# Patient Record
Sex: MALE | Race: WHITE | NOT HISPANIC OR LATINO | RURAL
[De-identification: names, ages, dates, MRNs, and addresses within clinical notes are randomized per-mention and may not be internally consistent; named-entity substitution may affect disease eponyms.]

---

## 2023-04-05 ENCOUNTER — TELEPHONE (OUTPATIENT)
Dept: ORTHOPEDICS | Facility: CLINIC | Age: 68
End: 2023-04-05
Payer: MEDICARE

## 2023-04-06 DIAGNOSIS — M25.562 LEFT KNEE PAIN, UNSPECIFIED CHRONICITY: Primary | ICD-10-CM

## 2023-04-10 ENCOUNTER — OFFICE VISIT (OUTPATIENT)
Dept: ORTHOPEDICS | Facility: CLINIC | Age: 68
End: 2023-04-10
Payer: MEDICARE

## 2023-04-10 ENCOUNTER — HOSPITAL ENCOUNTER (OUTPATIENT)
Dept: RADIOLOGY | Facility: HOSPITAL | Age: 68
Discharge: HOME | End: 2023-04-10
Attending: ORTHOPAEDIC SURGERY
Payer: MEDICARE

## 2023-04-10 VITALS — WEIGHT: 185 LBS | BODY MASS INDEX: 25.06 KG/M2 | HEIGHT: 72 IN

## 2023-04-10 DIAGNOSIS — M17.12 PRIMARY OSTEOARTHRITIS OF LEFT KNEE: Primary | ICD-10-CM

## 2023-04-10 DIAGNOSIS — M25.562 LEFT KNEE PAIN, UNSPECIFIED CHRONICITY: ICD-10-CM

## 2023-04-10 PROCEDURE — 73562 X-RAY EXAM OF KNEE 3: CPT | Mod: 26,LT,, | Performed by: ORTHOPAEDIC SURGERY

## 2023-04-10 PROCEDURE — 99213 OFFICE O/P EST LOW 20 MIN: CPT | Mod: PBBFAC | Performed by: ORTHOPAEDIC SURGERY

## 2023-04-10 PROCEDURE — 73562 XR KNEE AP LAT WITH SUNRISE LEFT: ICD-10-PCS | Mod: 26,LT,, | Performed by: ORTHOPAEDIC SURGERY

## 2023-04-10 PROCEDURE — 99204 PR OFFICE/OUTPT VISIT, NEW, LEVL IV, 45-59 MIN: ICD-10-PCS | Mod: S$PBB,,, | Performed by: ORTHOPAEDIC SURGERY

## 2023-04-10 PROCEDURE — 99204 OFFICE O/P NEW MOD 45 MIN: CPT | Mod: S$PBB,,, | Performed by: ORTHOPAEDIC SURGERY

## 2023-04-10 PROCEDURE — 73562 X-RAY EXAM OF KNEE 3: CPT | Mod: TC,LT

## 2023-04-10 RX ORDER — GABAPENTIN 300 MG/1
1 CAPSULE ORAL NIGHTLY
COMMUNITY
Start: 2023-04-04

## 2023-04-10 RX ORDER — MELOXICAM 15 MG/1
1 TABLET ORAL DAILY
COMMUNITY
Start: 2023-04-04

## 2023-04-10 RX ORDER — LISINOPRIL 20 MG/1
20 TABLET ORAL DAILY
COMMUNITY
Start: 2023-04-04

## 2023-04-10 NOTE — PROGRESS NOTES
CLINIC NOTE       Chief Complaint   Patient presents with    Left Knee - Pain        Elvis Freeman is a 68 y.o. male seen today for evaluation left knee pain.  He is a retired physical therapist.  He is undergone right TKR performed by Dr. Fernie martino in the past.  Additionally he had a left knee ACL reconstruction performed by Dr. Martino in the past as well.  He is had progressive left knee pain and genu varum.  X-rays left knee today 04/10/2023 shows bones well mineralized.  There is severe tricompartmental DJD with complete obliteration of medial joint space, genu varum and translation of the femur on the tibia.  Marginal osteophytes present and tricompartmental distribution.    History reviewed. No pertinent past medical history.  History reviewed. No pertinent family history.  Current Outpatient Medications on File Prior to Visit   Medication Sig Dispense Refill    gabapentin (NEURONTIN) 300 MG capsule Take 1 capsule by mouth nightly.      lisinopriL (PRINIVIL,ZESTRIL) 20 MG tablet Take 20 mg by mouth once daily. For 30 days      meloxicam (MOBIC) 15 MG tablet Take 1 tablet by mouth once daily.       No current facility-administered medications on file prior to visit.       ROS     There were no vitals filed for this visit.    Past Surgical History:   Procedure Laterality Date    ANTERIOR CRUCIATE LIGAMENT REPAIR Left     APPENDECTOMY N/A     HERNIA REPAIR N/A     KNEE SURGERY Right     knee replacement    LASIK          Review of patient's allergies indicates:   Allergen Reactions    Benadryl allergy decongestant     Opioids - morphine analogues Hives        Ortho Exam :  Well-developed well-nourished  male no acute distress.  Alert oriented cooperative.  Neck is supple without JVD.  Breathing is regular nonlabored.  Skin is warm dry no lesions seen.  Exam left knee shows severe genu varum.  Well-healed incision over the medial parapatellar region  Knee motion is 0-125 degrees of flexion.  Knee  ligaments stable clinically.  Medial and subpatellar crepitation is appreciated with knee motion.  Knee ligaments stable clinically.      Assessment and Plan  There are no problems to display for this patient.   Impression:  Severe end-stage DJD-left knee  Plan:  Patient offered left total knee replacement arthroplasty.  Procedure was discussed in detail using models and actual components.  The potential benefits and risks surgery outlined to include but not limited to bleeding infection, damage to blood vessels and nerves, need for further surgery, other risks and complications including even death.  Patient indicates he lives alone has a sister in [place] who has undergone TKR.  Discussed the option for swing bed placement.  He has 2 dogs at home that concern regarding their care while he is recovering.  He will call for scheduling left TKR if he wished to proceed.      Edgar Florez M.D.

## 2023-04-10 NOTE — PROGRESS NOTES
Radiology Interpretation        Patient Name: Elvis Freeman  Date: 4/10/2023  YOB: 1955  MRN# [id number]        ORDERING DIAGNOSIS:  No diagnosis found.          X-rays left knee today 04/10/2023 shows bones well mineralized.  There is severe tricompartmental DJD with complete obliteration of medial joint space, genu varum and translation of the femur on the tibia.  Marginal osteophytes present and tricompartmental distribution.             Edgar Florez MD